# Patient Record
Sex: FEMALE | Race: WHITE | URBAN - METROPOLITAN AREA
[De-identification: names, ages, dates, MRNs, and addresses within clinical notes are randomized per-mention and may not be internally consistent; named-entity substitution may affect disease eponyms.]

---

## 2017-02-15 ENCOUNTER — IMPORTED ENCOUNTER (OUTPATIENT)
Dept: URBAN - METROPOLITAN AREA CLINIC 50 | Facility: CLINIC | Age: 72
End: 2017-02-15

## 2017-03-07 ENCOUNTER — IMPORTED ENCOUNTER (OUTPATIENT)
Dept: URBAN - METROPOLITAN AREA CLINIC 50 | Facility: CLINIC | Age: 72
End: 2017-03-07

## 2017-03-08 ENCOUNTER — IMPORTED ENCOUNTER (OUTPATIENT)
Dept: URBAN - METROPOLITAN AREA CLINIC 50 | Facility: CLINIC | Age: 72
End: 2017-03-08

## 2017-03-24 ENCOUNTER — IMPORTED ENCOUNTER (OUTPATIENT)
Dept: URBAN - METROPOLITAN AREA CLINIC 50 | Facility: CLINIC | Age: 72
End: 2017-03-24

## 2017-03-29 ENCOUNTER — IMPORTED ENCOUNTER (OUTPATIENT)
Dept: URBAN - METROPOLITAN AREA CLINIC 50 | Facility: CLINIC | Age: 72
End: 2017-03-29

## 2017-04-04 ENCOUNTER — IMPORTED ENCOUNTER (OUTPATIENT)
Dept: URBAN - METROPOLITAN AREA CLINIC 50 | Facility: CLINIC | Age: 72
End: 2017-04-04

## 2017-04-05 ENCOUNTER — IMPORTED ENCOUNTER (OUTPATIENT)
Dept: URBAN - METROPOLITAN AREA CLINIC 50 | Facility: CLINIC | Age: 72
End: 2017-04-05

## 2017-04-07 ENCOUNTER — IMPORTED ENCOUNTER (OUTPATIENT)
Dept: URBAN - METROPOLITAN AREA CLINIC 50 | Facility: CLINIC | Age: 72
End: 2017-04-07

## 2017-04-12 ENCOUNTER — IMPORTED ENCOUNTER (OUTPATIENT)
Dept: URBAN - METROPOLITAN AREA CLINIC 50 | Facility: CLINIC | Age: 72
End: 2017-04-12

## 2017-05-17 ENCOUNTER — IMPORTED ENCOUNTER (OUTPATIENT)
Dept: URBAN - METROPOLITAN AREA CLINIC 50 | Facility: CLINIC | Age: 72
End: 2017-05-17

## 2018-04-02 NOTE — PATIENT DISCUSSION
(K99.303) Keratoconjunct sicca, not specified as Sjogren's, bilateral - Assesment : Examination revealed Dry Eye Syndrome - Plan : Monitor for changes. ATs prn daily and when reading.

## 2018-04-02 NOTE — PATIENT DISCUSSION
(H40.013) Open angle with borderline findings, low risk, bilateral - Assesment : Examination revealed suspicion for Open Angle Glaucoma. Baseline OCT ONH today./  + Fhx - Plan : Monitor for IOP and NFL changes with visits and testing. Advised Pt of condition and importance of monitoring with visits. RTC in 1 year for Exam and OCT ONH, sooner if problems or changes.

## 2018-04-02 NOTE — PATIENT DISCUSSION
(H25.13) Age-related nuclear cataract, bilateral - Assesment : Examination revealed cataract. - Plan : Monitor for changes. Advised patient to call our office with decreased vision or increased symptoms. Updated GLRx given today.

## 2021-04-17 ASSESSMENT — PACHYMETRY
OS_CT_UM: 598
OS_CT_UM: 598
OD_CT_UM: 601
OS_CT_UM: 598
OD_CT_UM: 601
OS_CT_UM: 598
OS_CT_UM: 598
OD_CT_UM: 601
OS_CT_UM: 598
OD_CT_UM: 601
OD_CT_UM: 601
OS_CT_UM: 598
OD_CT_UM: 601
OS_CT_UM: 598
OS_CT_UM: 598

## 2021-04-17 ASSESSMENT — VISUAL ACUITY
OD_BAT: 20/70
OD_SC: 20/40=
OS_BAT: 20/70
OS_BAT: 20/80
OD_CC: 20/25
OD_SC: 20/30
OS_SC: 20/30-
OS_OTHER: 20/80. 20/<400.
OD_OTHER: 20/70. 20/80-.
OS_SC: 20/40
OS_CC: 20/60
OD_PH: 20/30
OS_OTHER: 20/70. 20/80-.
OD_SC: 20/100
OS_PH: 20/30+2
OD_SC: 20/25
OS_CC: 20/25-
OS_CC: 20/25-
OD_BAT: 20/70
OS_SC: 20/30-
OS_SC: 20/25-1
OD_SC: 20/40+
OD_CC: 20/25

## 2021-04-17 ASSESSMENT — TONOMETRY
OD_IOP_MMHG: 14
OD_IOP_MMHG: 17
OS_IOP_MMHG: 19
OD_IOP_MMHG: 20
OD_IOP_MMHG: 20
OS_IOP_MMHG: 17
OD_IOP_MMHG: 16
OD_IOP_MMHG: 19
OS_IOP_MMHG: 18
OD_IOP_MMHG: 19
OD_IOP_MMHG: 12
OS_IOP_MMHG: 16
OD_IOP_MMHG: 17
OS_IOP_MMHG: 13
OS_IOP_MMHG: 16
OD_IOP_MMHG: 17
OD_IOP_MMHG: 17
OD_IOP_MMHG: 16
OS_IOP_MMHG: 21
OS_IOP_MMHG: 15
OS_IOP_MMHG: 19
OD_IOP_MMHG: 14
OS_IOP_MMHG: 14
OS_IOP_MMHG: 16
OD_IOP_MMHG: 15
OS_IOP_MMHG: 19
OS_IOP_MMHG: 22
OS_IOP_MMHG: 18